# Patient Record
(demographics unavailable — no encounter records)

---

## 2025-01-06 NOTE — ASSESSMENT
[FreeTextEntry1] : 57 Y OLD FEM WITH PMX OF DYSLIPIDEMIA = ROSUVASTAIN 10 MG AND LABS ORDERED RENAL DONOR = LABS  SEDENTARY = ADVISED RTO 4  M